# Patient Record
Sex: FEMALE | Race: ASIAN | Employment: UNEMPLOYED | URBAN - METROPOLITAN AREA
[De-identification: names, ages, dates, MRNs, and addresses within clinical notes are randomized per-mention and may not be internally consistent; named-entity substitution may affect disease eponyms.]

---

## 2024-01-16 ENCOUNTER — HOSPITAL ENCOUNTER (OUTPATIENT)
Age: 29
Discharge: HOME | End: 2024-01-16

## 2024-01-16 PROCEDURE — 90471 IMMUNIZATION ADMIN: CPT | Performed by: NURSE PRACTITIONER

## 2024-01-16 PROCEDURE — 90675 RABIES VACCINE IM: CPT | Performed by: NURSE PRACTITIONER

## 2024-01-16 NOTE — ED QUICK NOTES
[id number] pt here for rabies vaccine only.  Pt had her initial doses of the vaccine in the [facility] as she was visiting family and was bitten by a domestic cat. Pt was due for her final vaccine on 12/29/2023. Pt states she was still traveling the vaccine wasn't available in the area where she was traveling. I spoke with the nurses in Infectious Disease and the patient was given her final  rabies vaccine.